# Patient Record
Sex: FEMALE | Race: WHITE | NOT HISPANIC OR LATINO | URBAN - METROPOLITAN AREA
[De-identification: names, ages, dates, MRNs, and addresses within clinical notes are randomized per-mention and may not be internally consistent; named-entity substitution may affect disease eponyms.]

---

## 2024-10-30 ENCOUNTER — OFFICE VISIT (OUTPATIENT)
Dept: PRIMARY CARE | Facility: CLINIC | Age: 74
End: 2024-10-30
Payer: MEDICARE

## 2024-10-30 VITALS
HEART RATE: 76 BPM | OXYGEN SATURATION: 94 % | SYSTOLIC BLOOD PRESSURE: 124 MMHG | BODY MASS INDEX: 32.86 KG/M2 | DIASTOLIC BLOOD PRESSURE: 62 MMHG | WEIGHT: 209.8 LBS

## 2024-10-30 DIAGNOSIS — Z12.11 ENCOUNTER FOR SCREENING FOR MALIGNANT NEOPLASM OF COLON: ICD-10-CM

## 2024-10-30 DIAGNOSIS — F17.200 TOBACCO USE DISORDER: ICD-10-CM

## 2024-10-30 DIAGNOSIS — M15.0 PRIMARY OSTEOARTHRITIS INVOLVING MULTIPLE JOINTS: ICD-10-CM

## 2024-10-30 DIAGNOSIS — Z78.0 MENOPAUSE: ICD-10-CM

## 2024-10-30 DIAGNOSIS — J30.1 SEASONAL ALLERGIC RHINITIS DUE TO POLLEN: ICD-10-CM

## 2024-10-30 DIAGNOSIS — E55.9 VITAMIN D DEFICIENCY: ICD-10-CM

## 2024-10-30 DIAGNOSIS — J31.0 CHRONIC RHINITIS: ICD-10-CM

## 2024-10-30 DIAGNOSIS — Z00.00 WELL ADULT EXAM: Primary | ICD-10-CM

## 2024-10-30 DIAGNOSIS — F41.8 DEPRESSION WITH ANXIETY: ICD-10-CM

## 2024-10-30 DIAGNOSIS — Z12.31 ENCOUNTER FOR SCREENING MAMMOGRAM FOR BREAST CANCER: ICD-10-CM

## 2024-10-30 DIAGNOSIS — F17.210 CIGARETTE SMOKER: ICD-10-CM

## 2024-10-30 DIAGNOSIS — R63.5 WEIGHT GAIN: ICD-10-CM

## 2024-10-30 PROBLEM — C44.319 BASAL CELL CARCINOMA OF SKIN OF OTHER PARTS OF FACE: Status: ACTIVE | Noted: 2022-12-12

## 2024-10-30 PROBLEM — J30.9 ALLERGIC RHINITIS: Status: ACTIVE | Noted: 2024-10-30

## 2024-10-30 PROBLEM — M19.90 OSTEOARTHRITIS: Status: ACTIVE | Noted: 2023-04-04

## 2024-10-30 PROCEDURE — 1126F AMNT PAIN NOTED NONE PRSNT: CPT | Performed by: INTERNAL MEDICINE

## 2024-10-30 PROCEDURE — G0439 PPPS, SUBSEQ VISIT: HCPCS | Performed by: INTERNAL MEDICINE

## 2024-10-30 PROCEDURE — 99215 OFFICE O/P EST HI 40 MIN: CPT | Performed by: INTERNAL MEDICINE

## 2024-10-30 PROCEDURE — 90677 PCV20 VACCINE IM: CPT | Performed by: INTERNAL MEDICINE

## 2024-10-30 PROCEDURE — 1159F MED LIST DOCD IN RCRD: CPT | Performed by: INTERNAL MEDICINE

## 2024-10-30 PROCEDURE — 4004F PT TOBACCO SCREEN RCVD TLK: CPT | Performed by: INTERNAL MEDICINE

## 2024-10-30 PROCEDURE — G0008 ADMIN INFLUENZA VIRUS VAC: HCPCS | Performed by: INTERNAL MEDICINE

## 2024-10-30 RX ORDER — ESCITALOPRAM OXALATE 10 MG/1
10 TABLET ORAL
COMMUNITY
End: 2024-10-30 | Stop reason: SDUPTHER

## 2024-10-30 RX ORDER — ESCITALOPRAM OXALATE 10 MG/1
10 TABLET ORAL
Qty: 90 TABLET | Refills: 3 | Status: SHIPPED | OUTPATIENT
Start: 2024-10-30

## 2024-10-30 RX ORDER — BUPROPION HYDROCHLORIDE 150 MG/1
150 TABLET ORAL EVERY MORNING
Qty: 90 TABLET | Refills: 3 | Status: SHIPPED | OUTPATIENT
Start: 2024-10-30

## 2024-10-30 RX ORDER — MONTELUKAST SODIUM 10 MG/1
10 TABLET ORAL NIGHTLY
Qty: 90 TABLET | Refills: 0 | Status: SHIPPED | OUTPATIENT
Start: 2024-10-30

## 2024-10-30 RX ORDER — BUPROPION HYDROCHLORIDE 150 MG/1
150 TABLET ORAL EVERY MORNING
COMMUNITY
Start: 2022-06-14 | End: 2024-10-30 | Stop reason: SDUPTHER

## 2024-10-30 RX ORDER — GLUCOSAM/CHONDRO/HERB 149/HYAL 750-100 MG
1 TABLET ORAL EVERY 24 HOURS
COMMUNITY

## 2024-10-30 ASSESSMENT — ENCOUNTER SYMPTOMS
SORE THROAT: 0
SHORTNESS OF BREATH: 0
LOSS OF SENSATION IN FEET: 0
DYSURIA: 0
ABDOMINAL PAIN: 0
DEPRESSION: 0
EYE PAIN: 0
DIARRHEA: 0
PSYCHIATRIC NEGATIVE: 1
FREQUENCY: 0
FATIGUE: 0
CONSTIPATION: 0
NAUSEA: 0
CHILLS: 0
DIZZINESS: 0
JOINT SWELLING: 0
PALPITATIONS: 0
ACTIVITY CHANGE: 0
POLYDIPSIA: 0
CHEST TIGHTNESS: 0
ARTHRALGIAS: 1
COUGH: 1
NUMBNESS: 0
HEADACHES: 0
VOMITING: 0
OCCASIONAL FEELINGS OF UNSTEADINESS: 0
COLOR CHANGE: 0
FEVER: 0

## 2024-10-30 ASSESSMENT — PAIN SCALES - GENERAL: PAINLEVEL_OUTOF10: 0-NO PAIN

## 2024-10-30 ASSESSMENT — PATIENT HEALTH QUESTIONNAIRE - PHQ9
SUM OF ALL RESPONSES TO PHQ9 QUESTIONS 1 AND 2: 0
2. FEELING DOWN, DEPRESSED OR HOPELESS: NOT AT ALL
1. LITTLE INTEREST OR PLEASURE IN DOING THINGS: NOT AT ALL

## 2024-11-05 ENCOUNTER — HOSPITAL ENCOUNTER (OUTPATIENT)
Dept: RADIOLOGY | Facility: HOSPITAL | Age: 74
Discharge: HOME | End: 2024-11-05
Payer: MEDICARE

## 2024-11-05 DIAGNOSIS — Z78.0 MENOPAUSE: ICD-10-CM

## 2024-11-05 PROCEDURE — 77080 DXA BONE DENSITY AXIAL: CPT | Performed by: RADIOLOGY

## 2024-11-05 PROCEDURE — 77080 DXA BONE DENSITY AXIAL: CPT

## 2024-11-05 NOTE — LETTER
November 6, 2024     Irene Baig  [address] [place] [address] [place] [address]      Dear Ms. Baig:    Below are the results from your recent visit:  Lost a little ground but still osteopenia-recheck in 1 year     Resulted Orders   XR DEXA bone density    Narrative    Interpreted By:  Sonal Garcia,   STUDY:  DEXA BONE EUZAJBL37/5/2024 2:27 pm      INDICATION:  Signs/Symptoms:menopause. The patient is a 74 y/o  year old F.  Postmenopausal denying hormonal replacement therapy      ,Z78.0 Asymptomatic menopausal state      COMPARISON:  11/22/2019      ACCESSION NUMBER(S):  [id number]      ORDERING CLINICIAN:  NISHA DONG      TECHNIQUE:  DEXA BONE DENSITY      FINDINGS:  SPINE L1-L4  Bone Mineral Density: 0.979 g/cm2  T-Score -0.6  Z-Score 1.7  Bone Mineral Density change vs baseline:  -2.7 %  Bone Mineral Density change vs previous: -2.7 %      LEFT FEMUR -TOTAL  Bone Mineral Density: 0.967 g/cm2  T-Score 0.2   Z-Score  1.9  Bone Mineral Density change vs baseline: 5.7 %  Bone Mineral Density change vs previous: 5.7 %      LEFT FEMUR -NECK  Bone Mineral Density: 0.718 g/cm2  T-Score -1.2  Z-Score 0.8  Bone Mineral Density change vs baseline: -8.4 %  Bone Mineral Density change vs previous: -8.4 %          World Health Organization (WHO) criteria for post-menopausal,   Women:  Normal:         T-score at or above -1 SD  Osteopenia:   T-score between -1 and -2.5 SD  Osteoporosis: T-score at or below -2.5 SD          10-year Fracture Risk:  Major Osteoporotic Fracture  9.6 % without prior fracture and 15%  with prior fracture Hip Fracture                        1.5 % without  prior fracture and 2.1% with prior fracture      Note:  If no FRAX score is reported, it is because:  Some T-score for Spine Total or Hip Total or Femoral Neck at or below  -2.5      This exam was performed at [facility] on a Interview Master C Dexa Unit.        Impression    DEXA:  According to World Health Organization  criteria,  classification is low bone mass (osteopenia)      Followup recommended in two years or sooner as clinically warranted.      All images and detailed analysis are available on the  Radiology  PACS.      MACRO:  None      Signed by: Sonal Garcia 11/5/2024 2:47 PM  Dictation workstation:   [id number]     The test results show that your current treatment is working. Please continue your current medication and plan. We recommend that you repeat the above test(s) in 1 year.    If you have any questions or concerns, please don't hesitate to call.         Sincerely,    Dr. Carmela Rodriguez and Staff

## 2024-11-06 DIAGNOSIS — M85.80 OSTEOPENIA, UNSPECIFIED LOCATION: ICD-10-CM

## 2024-11-06 DIAGNOSIS — Z78.0 POSTMENOPAUSAL STATUS, AGE-RELATED: ICD-10-CM

## 2024-12-04 ENCOUNTER — APPOINTMENT (OUTPATIENT)
Dept: RADIOLOGY | Facility: HOSPITAL | Age: 74
End: 2024-12-04
Payer: MEDICARE

## 2025-01-15 ENCOUNTER — APPOINTMENT (OUTPATIENT)
Dept: RADIOLOGY | Facility: HOSPITAL | Age: 75
End: 2025-01-15
Payer: MEDICARE

## 2025-01-16 ENCOUNTER — APPOINTMENT (OUTPATIENT)
Dept: RADIOLOGY | Facility: HOSPITAL | Age: 75
End: 2025-01-16
Payer: MEDICARE

## 2025-02-06 DIAGNOSIS — J31.0 CHRONIC RHINITIS: ICD-10-CM

## 2025-02-06 RX ORDER — MONTELUKAST SODIUM 10 MG/1
10 TABLET ORAL NIGHTLY
Qty: 90 TABLET | Refills: 3 | Status: SHIPPED | OUTPATIENT
Start: 2025-02-06